# Patient Record
Sex: FEMALE
[De-identification: names, ages, dates, MRNs, and addresses within clinical notes are randomized per-mention and may not be internally consistent; named-entity substitution may affect disease eponyms.]

---

## 2017-08-25 ENCOUNTER — TRANSCRIPTION ENCOUNTER (OUTPATIENT)
Age: 57
End: 2017-08-25

## 2018-04-02 ENCOUNTER — TRANSCRIPTION ENCOUNTER (OUTPATIENT)
Age: 58
End: 2018-04-02

## 2018-08-11 ENCOUNTER — TRANSCRIPTION ENCOUNTER (OUTPATIENT)
Age: 58
End: 2018-08-11

## 2019-04-05 ENCOUNTER — TRANSCRIPTION ENCOUNTER (OUTPATIENT)
Age: 59
End: 2019-04-05

## 2019-11-11 ENCOUNTER — TRANSCRIPTION ENCOUNTER (OUTPATIENT)
Age: 59
End: 2019-11-11

## 2019-11-27 ENCOUNTER — TRANSCRIPTION ENCOUNTER (OUTPATIENT)
Age: 59
End: 2019-11-27

## 2020-04-08 ENCOUNTER — TRANSCRIPTION ENCOUNTER (OUTPATIENT)
Age: 60
End: 2020-04-08

## 2022-11-04 ENCOUNTER — OFFICE (OUTPATIENT)
Dept: URBAN - METROPOLITAN AREA CLINIC 12 | Facility: CLINIC | Age: 62
Setting detail: OPHTHALMOLOGY
End: 2022-11-04
Payer: COMMERCIAL

## 2022-11-04 ENCOUNTER — RX ONLY (RX ONLY)
Age: 62
End: 2022-11-04

## 2022-11-04 DIAGNOSIS — B30.9: ICD-10-CM

## 2022-11-04 DIAGNOSIS — H20.13: ICD-10-CM

## 2022-11-04 PROCEDURE — 99212 OFFICE O/P EST SF 10 MIN: CPT | Performed by: STUDENT IN AN ORGANIZED HEALTH CARE EDUCATION/TRAINING PROGRAM

## 2022-11-04 ASSESSMENT — CONFRONTATIONAL VISUAL FIELD TEST (CVF)
OS_FINDINGS: FULL
OD_FINDINGS: FULL

## 2022-11-04 ASSESSMENT — TONOMETRY
OS_IOP_MMHG: 11
OD_IOP_MMHG: 13

## 2022-11-04 ASSESSMENT — SUPERFICIAL PUNCTATE KERATITIS (SPK)
OD_SPK: T
OS_SPK: T

## 2022-11-05 ENCOUNTER — RX ONLY (RX ONLY)
Age: 62
End: 2022-11-05

## 2022-11-05 ASSESSMENT — REFRACTION_MANIFEST
OD_AXIS: 030
OS_CYLINDER: -0.25
OS_ADD: +2.00
OD_ADD: +2.00
OD_VA1: 20/25-1
OS_SPHERE: +0.50
OD_SPHERE: -0.75
OS_AXIS: 010
OD_SPHERE: -1.75
OD_CYLINDER: -0.75
OS_VA1: 20/30
OS_VA1: 20/30
OD_VA1: 20/30
OS_AXIS: 120
OS_SPHERE: PLANO
OD_AXIS: 25
OS_CYLINDER: -0.25
OD_CYLINDER: -0.75

## 2022-11-05 ASSESSMENT — KERATOMETRY
METHOD_AUTO_MANUAL: AUTO
OD_K1POWER_DIOPTERS: 36.50
OS_K2POWER_DIOPTERS: 37.25
OS_K1POWER_DIOPTERS: 37.00
OD_AXISANGLE_DEGREES: 093
OS_AXISANGLE_DEGREES: 064
OD_K2POWER_DIOPTERS: 36.75

## 2022-11-05 ASSESSMENT — REFRACTION_CURRENTRX
OD_OVR_VA: 20/
OS_VPRISM_DIRECTION: SV
OS_CYLINDER: -0.50
OS_AXIS: 108
OD_AXIS: 173
OD_VPRISM_DIRECTION: SV
OD_AXIS: 118
OD_CYLINDER: -1.25
OS_CYLINDER: SPHERE
OD_OVR_VA: 20/
OS_OVR_VA: 20/
OS_SPHERE: PLANO
OS_SPHERE: -1.25
OD_SPHERE: -0.75
OD_CYLINDER: -0.50
OD_SPHERE: -1.25
OS_AXIS: 000
OS_OVR_VA: 20/

## 2022-11-05 ASSESSMENT — AXIALLENGTH_DERIVED
OD_AL: 28.82
OS_AL: 26.0127
OD_AL: 27.5
OS_AL: 27.38
OD_AL: 26.9812

## 2022-11-05 ASSESSMENT — VISUAL ACUITY
OD_BCVA: 20/60-1
OS_BCVA: 20/40

## 2022-11-05 ASSESSMENT — REFRACTION_AUTOREFRACTION
OS_CYLINDER: -0.25
OD_CYLINDER: -0.50
OD_SPHERE: -4.25
OS_SPHERE: -2.25
OS_AXIS: 001
OD_AXIS: 154

## 2022-11-05 ASSESSMENT — SPHEQUIV_DERIVED
OS_SPHEQUIV: -2.375
OS_SPHEQUIV: 0.375
OD_SPHEQUIV: -4.5
OD_SPHEQUIV: -1.125
OD_SPHEQUIV: -2.125

## 2022-11-18 ENCOUNTER — OFFICE (OUTPATIENT)
Dept: URBAN - METROPOLITAN AREA CLINIC 12 | Facility: CLINIC | Age: 62
Setting detail: OPHTHALMOLOGY
End: 2022-11-18
Payer: COMMERCIAL

## 2022-11-18 DIAGNOSIS — H25.13: ICD-10-CM

## 2022-11-18 DIAGNOSIS — H25.12: ICD-10-CM

## 2022-11-18 PROBLEM — B30.9 VIRAL CONJUNCTIVITIS ; RIGHT EYE: Status: RESOLVED | Noted: 2022-11-04 | Resolved: 2022-11-18

## 2022-11-18 PROCEDURE — 99214 OFFICE O/P EST MOD 30 MIN: CPT | Performed by: OPHTHALMOLOGY

## 2022-11-18 PROCEDURE — 92136 OPHTHALMIC BIOMETRY: CPT | Performed by: OPHTHALMOLOGY

## 2022-11-18 ASSESSMENT — TONOMETRY
OD_IOP_MMHG: 11
OS_IOP_MMHG: 11

## 2022-11-18 ASSESSMENT — SUPERFICIAL PUNCTATE KERATITIS (SPK)
OS_SPK: T
OD_SPK: T

## 2022-11-18 ASSESSMENT — CONFRONTATIONAL VISUAL FIELD TEST (CVF)
OD_FINDINGS: FULL
OS_FINDINGS: FULL

## 2022-12-06 ASSESSMENT — REFRACTION_MANIFEST
OS_SPHERE: -2.25
OS_AXIS: 170
OS_CYLINDER: -0.50
OD_VA1: 20/30
OD_CYLINDER: -0.75
OD_AXIS: 160
OD_AXIS: 030
OS_VA1: 20/40-1
OD_CYLINDER: -0.50
OD_SPHERE: -0.75
OD_ADD: +2.00
OS_VA1: 20/30
OS_ADD: +2.00
OS_AXIS: 010
OS_SPHERE: +0.50
OS_CYLINDER: -0.25
OD_SPHERE: -3.50

## 2022-12-06 ASSESSMENT — VISUAL ACUITY
OD_BCVA: 20/50+2
OS_BCVA: 20/40-1

## 2022-12-06 ASSESSMENT — REFRACTION_CURRENTRX
OD_SPHERE: -1.25
OS_AXIS: 108
OS_OVR_VA: 20/
OD_OVR_VA: 20/
OS_AXIS: 000
OD_SPHERE: -0.75
OS_SPHERE: -1.25
OS_SPHERE: PLANO
OS_VPRISM_DIRECTION: SV
OS_OVR_VA: 20/
OS_CYLINDER: -0.50
OD_VPRISM_DIRECTION: SV
OD_CYLINDER: -0.50
OD_OVR_VA: 20/
OD_CYLINDER: -1.25
OD_AXIS: 118
OD_AXIS: 171
OS_CYLINDER: SPHERE

## 2022-12-06 ASSESSMENT — REFRACTION_AUTOREFRACTION
OD_SPHERE: -3.50
OS_SPHERE: -2.25
OS_CYLINDER: -0.50
OS_AXIS: 172
OD_CYLINDER: -0.50
OD_AXIS: 161

## 2022-12-06 ASSESSMENT — KERATOMETRY
OS_K1POWER_DIOPTERS: 37.00
OD_AXISANGLE_DEGREES: 073
OS_K2POWER_DIOPTERS: 37.50
OD_K1POWER_DIOPTERS: 36.00
OS_AXISANGLE_DEGREES: 068
OD_K2POWER_DIOPTERS: 36.50
METHOD_AUTO_MANUAL: AUTO

## 2022-12-06 ASSESSMENT — AXIALLENGTH_DERIVED
OS_AL: 27.39
OD_AL: 27.1626
OS_AL: 27.39
OD_AL: 28.59
OD_AL: 28.59
OS_AL: 25.957

## 2022-12-06 ASSESSMENT — SPHEQUIV_DERIVED
OD_SPHEQUIV: -1.125
OS_SPHEQUIV: -2.5
OD_SPHEQUIV: -3.75
OS_SPHEQUIV: 0.375
OS_SPHEQUIV: -2.5
OD_SPHEQUIV: -3.75

## 2023-01-03 ENCOUNTER — OFFICE (OUTPATIENT)
Dept: URBAN - METROPOLITAN AREA CLINIC 12 | Facility: CLINIC | Age: 63
Setting detail: OPHTHALMOLOGY
End: 2023-01-03
Payer: COMMERCIAL

## 2023-01-03 DIAGNOSIS — H25.13: ICD-10-CM

## 2023-01-03 DIAGNOSIS — H25.12: ICD-10-CM

## 2023-01-03 PROCEDURE — 99213 OFFICE O/P EST LOW 20 MIN: CPT | Performed by: OPHTHALMOLOGY

## 2023-01-03 PROCEDURE — 92136 OPHTHALMIC BIOMETRY: CPT | Performed by: OPHTHALMOLOGY

## 2023-01-03 ASSESSMENT — SUPERFICIAL PUNCTATE KERATITIS (SPK)
OS_SPK: T
OD_SPK: T

## 2023-01-03 ASSESSMENT — SPHEQUIV_DERIVED
OD_SPHEQUIV: -4
OD_SPHEQUIV: -3.75
OS_SPHEQUIV: -2.5
OD_SPHEQUIV: -1.125
OS_SPHEQUIV: -2.375
OS_SPHEQUIV: 0.375

## 2023-01-03 ASSESSMENT — REFRACTION_MANIFEST
OS_AXIS: 010
OD_ADD: +2.00
OD_SPHERE: -0.75
OS_AXIS: 170
OD_SPHERE: -3.50
OS_VA1: 20/40-1
OS_VA1: 20/30
OS_CYLINDER: -0.25
OD_CYLINDER: -0.75
OS_SPHERE: -2.25
OD_VA1: 20/30
OS_ADD: +2.00
OD_CYLINDER: -0.50
OS_CYLINDER: -0.50
OS_SPHERE: +0.50
OD_AXIS: 030
OD_AXIS: 160

## 2023-01-03 ASSESSMENT — REFRACTION_CURRENTRX
OD_CYLINDER: -1.25
OS_OVR_VA: 20/
OD_SPHERE: -1.25
OS_CYLINDER: SPHERE
OS_VPRISM_DIRECTION: SV
OD_CYLINDER: -0.50
OS_SPHERE: PLANO
OS_AXIS: 000
OS_CYLINDER: -0.50
OD_OVR_VA: 20/
OD_SPHERE: -0.75
OD_AXIS: 118
OD_VPRISM_DIRECTION: SV
OD_OVR_VA: 20/
OS_AXIS: 108
OS_OVR_VA: 20/
OD_AXIS: 171
OS_SPHERE: -1.25

## 2023-01-03 ASSESSMENT — CONFRONTATIONAL VISUAL FIELD TEST (CVF)
OD_FINDINGS: FULL
OS_FINDINGS: FULL

## 2023-01-03 ASSESSMENT — REFRACTION_AUTOREFRACTION
OS_SPHERE: -2.25
OD_CYLINDER: -0.50
OD_AXIS: 017
OS_CYLINDER: -0.25
OD_SPHERE: -3.75
OS_AXIS: 111

## 2023-01-03 ASSESSMENT — TONOMETRY
OD_IOP_MMHG: 11
OS_IOP_MMHG: 10

## 2023-01-03 ASSESSMENT — KERATOMETRY
OS_K1POWER_DIOPTERS: 37.00
OD_AXISANGLE_DEGREES: 089
OS_K2POWER_DIOPTERS: 37.25
METHOD_AUTO_MANUAL: AUTO
OD_K2POWER_DIOPTERS: 36.75
OD_K1POWER_DIOPTERS: 36.50
OS_AXISANGLE_DEGREES: 047

## 2023-01-03 ASSESSMENT — AXIALLENGTH_DERIVED
OD_AL: 28.53
OS_AL: 26.0127
OD_AL: 26.9812
OS_AL: 27.45
OD_AL: 28.39
OS_AL: 27.38

## 2023-01-03 ASSESSMENT — VISUAL ACUITY
OD_BCVA: 20/50-1
OS_BCVA: 20/50

## 2023-01-13 ENCOUNTER — NON-APPOINTMENT (OUTPATIENT)
Age: 63
End: 2023-01-13

## 2023-01-13 ENCOUNTER — OFFICE (OUTPATIENT)
Dept: URBAN - METROPOLITAN AREA CLINIC 12 | Facility: CLINIC | Age: 63
Setting detail: OPHTHALMOLOGY
End: 2023-01-13
Payer: COMMERCIAL

## 2023-01-13 DIAGNOSIS — Z01.812: ICD-10-CM

## 2023-01-13 DIAGNOSIS — Z20.822: ICD-10-CM

## 2023-01-13 PROCEDURE — 99211 OFF/OP EST MAY X REQ PHY/QHP: CPT | Performed by: OPHTHALMOLOGY

## 2023-01-13 ASSESSMENT — KERATOMETRY
OD_K1POWER_DIOPTERS: 36.50
OD_AXISANGLE_DEGREES: 089
OS_AXISANGLE_DEGREES: 047
OD_K2POWER_DIOPTERS: 36.75
OS_K2POWER_DIOPTERS: 37.25
METHOD_AUTO_MANUAL: AUTO
OS_K1POWER_DIOPTERS: 37.00

## 2023-01-13 ASSESSMENT — AXIALLENGTH_DERIVED
OS_AL: 27.38
OS_AL: 27.45
OD_AL: 28.39
OD_AL: 26.9812
OD_AL: 28.53
OS_AL: 26.0127

## 2023-01-13 ASSESSMENT — SPHEQUIV_DERIVED
OS_SPHEQUIV: 0.375
OS_SPHEQUIV: -2.375
OS_SPHEQUIV: -2.5
OD_SPHEQUIV: -3.75
OD_SPHEQUIV: -4
OD_SPHEQUIV: -1.125

## 2023-01-13 ASSESSMENT — REFRACTION_CURRENTRX
OD_OVR_VA: 20/
OD_SPHERE: -0.75
OS_AXIS: 000
OD_AXIS: 171
OS_CYLINDER: -0.50
OD_VPRISM_DIRECTION: SV
OS_VPRISM_DIRECTION: SV
OS_OVR_VA: 20/
OS_CYLINDER: SPHERE
OD_OVR_VA: 20/
OS_SPHERE: -1.25
OD_CYLINDER: -1.25
OS_OVR_VA: 20/
OS_SPHERE: PLANO
OS_AXIS: 108
OD_SPHERE: -1.25
OD_CYLINDER: -0.50
OD_AXIS: 118

## 2023-01-13 ASSESSMENT — REFRACTION_MANIFEST
OS_SPHERE: +0.50
OD_CYLINDER: -0.75
OD_SPHERE: -0.75
OS_CYLINDER: -0.50
OS_SPHERE: -2.25
OS_AXIS: 010
OD_AXIS: 030
OS_VA1: 20/40-1
OD_AXIS: 160
OD_SPHERE: -3.50
OD_CYLINDER: -0.50
OD_VA1: 20/30
OS_AXIS: 170
OS_CYLINDER: -0.25
OS_VA1: 20/30
OD_ADD: +2.00
OS_ADD: +2.00

## 2023-01-13 ASSESSMENT — REFRACTION_AUTOREFRACTION
OD_CYLINDER: -0.50
OD_AXIS: 017
OD_SPHERE: -3.75
OS_AXIS: 111
OS_CYLINDER: -0.25
OS_SPHERE: -2.25

## 2023-01-13 ASSESSMENT — VISUAL ACUITY
OD_BCVA: 20/50-1
OS_BCVA: 20/50

## 2023-01-16 ENCOUNTER — ASC (OUTPATIENT)
Dept: URBAN - METROPOLITAN AREA SURGERY 8 | Facility: SURGERY | Age: 63
Setting detail: OPHTHALMOLOGY
End: 2023-01-16
Payer: COMMERCIAL

## 2023-01-16 DIAGNOSIS — H52.212: ICD-10-CM

## 2023-01-16 DIAGNOSIS — H25.12: ICD-10-CM

## 2023-01-16 PROCEDURE — 66984 XCAPSL CTRC RMVL W/O ECP: CPT | Performed by: OPHTHALMOLOGY

## 2023-01-16 PROCEDURE — FEMTO CATARACT LASER: Performed by: OPHTHALMOLOGY

## 2023-01-17 ENCOUNTER — RX ONLY (RX ONLY)
Age: 63
End: 2023-01-17

## 2023-01-17 ENCOUNTER — OFFICE (OUTPATIENT)
Dept: URBAN - METROPOLITAN AREA CLINIC 12 | Facility: CLINIC | Age: 63
Setting detail: OPHTHALMOLOGY
End: 2023-01-17
Payer: COMMERCIAL

## 2023-01-17 DIAGNOSIS — Z96.1: ICD-10-CM

## 2023-01-17 PROCEDURE — 99024 POSTOP FOLLOW-UP VISIT: CPT | Performed by: OPHTHALMOLOGY

## 2023-01-17 ASSESSMENT — REFRACTION_MANIFEST
OS_ADD: +2.00
OD_ADD: +2.00
OD_AXIS: 160
OS_CYLINDER: -0.25
OD_AXIS: 030
OS_SPHERE: +0.50
OS_VA1: 20/30
OD_CYLINDER: -0.50
OD_CYLINDER: -0.75
OD_SPHERE: -3.50
OD_VA1: 20/30
OS_AXIS: 010
OS_SPHERE: -2.25
OS_AXIS: 170
OD_SPHERE: -0.75
OS_VA1: 20/40-1
OS_CYLINDER: -0.50

## 2023-01-17 ASSESSMENT — KERATOMETRY
OD_AXISANGLE_DEGREES: 090
OS_K1POWER_DIOPTERS: 36.50
OS_K2POWER_DIOPTERS: 37.75
OS_AXISANGLE_DEGREES: 047
OD_K2POWER_DIOPTERS: 36.75
OD_K1POWER_DIOPTERS: 36.25
METHOD_AUTO_MANUAL: AUTO

## 2023-01-17 ASSESSMENT — REFRACTION_CURRENTRX
OD_VPRISM_DIRECTION: SV
OS_SPHERE: PLANO
OD_CYLINDER: -1.25
OS_AXIS: 108
OS_SPHERE: -1.25
OD_AXIS: 171
OD_CYLINDER: -0.50
OD_OVR_VA: 20/
OD_SPHERE: -0.75
OS_OVR_VA: 20/
OD_AXIS: 118
OS_AXIS: 000
OS_CYLINDER: SPHERE
OS_VPRISM_DIRECTION: SV
OD_SPHERE: -1.25
OS_OVR_VA: 20/
OD_OVR_VA: 20/
OS_CYLINDER: -0.50

## 2023-01-17 ASSESSMENT — SPHEQUIV_DERIVED
OS_SPHEQUIV: -0.875
OS_SPHEQUIV: -2.5
OD_SPHEQUIV: -4
OS_SPHEQUIV: 0.375
OD_SPHEQUIV: -3.75
OD_SPHEQUIV: -1.125

## 2023-01-17 ASSESSMENT — SUPERFICIAL PUNCTATE KERATITIS (SPK)
OS_SPK: T
OD_SPK: T

## 2023-01-17 ASSESSMENT — VISUAL ACUITY
OS_BCVA: 20/80
OD_BCVA: 20/30-2

## 2023-01-17 ASSESSMENT — REFRACTION_AUTOREFRACTION
OS_SPHERE: -0.25
OD_SPHERE: -3.75
OS_AXIS: 128
OD_CYLINDER: -0.50
OD_AXIS: 002
OS_CYLINDER: -1.25

## 2023-01-17 ASSESSMENT — AXIALLENGTH_DERIVED
OS_AL: 27.45
OD_AL: 27.0414
OS_AL: 26.0127
OD_AL: 28.46
OD_AL: 28.6
OS_AL: 26.6185

## 2023-01-17 ASSESSMENT — TONOMETRY
OS_IOP_MMHG: 13
OD_IOP_MMHG: 10

## 2023-01-17 ASSESSMENT — CONFRONTATIONAL VISUAL FIELD TEST (CVF)
OS_FINDINGS: FULL
OD_FINDINGS: FULL

## 2023-01-19 ENCOUNTER — NON-APPOINTMENT (OUTPATIENT)
Age: 63
End: 2023-01-19

## 2023-01-24 ENCOUNTER — OFFICE (OUTPATIENT)
Dept: URBAN - METROPOLITAN AREA CLINIC 12 | Facility: CLINIC | Age: 63
Setting detail: OPHTHALMOLOGY
End: 2023-01-24
Payer: COMMERCIAL

## 2023-01-24 DIAGNOSIS — H25.11: ICD-10-CM

## 2023-01-24 PROCEDURE — 92136 OPHTHALMIC BIOMETRY: CPT | Performed by: OPHTHALMOLOGY

## 2023-01-24 ASSESSMENT — VISUAL ACUITY
OS_BCVA: 20/50
OD_BCVA: 20/30-

## 2023-01-24 ASSESSMENT — REFRACTION_MANIFEST
OD_AXIS: 160
OS_VA1: 20/40-1
OS_CYLINDER: -0.25
OD_VA1: 20/30
OS_SPHERE: +0.50
OS_SPHERE: -2.25
OS_VA1: 20/30
OD_AXIS: 030
OS_AXIS: 010
OS_ADD: +2.00
OD_CYLINDER: -0.50
OS_CYLINDER: -0.50
OD_SPHERE: -3.50
OD_ADD: +2.00
OD_SPHERE: -0.75
OD_CYLINDER: -0.75
OS_AXIS: 170

## 2023-01-24 ASSESSMENT — SUPERFICIAL PUNCTATE KERATITIS (SPK)
OD_SPK: T
OS_SPK: T

## 2023-01-24 ASSESSMENT — AXIALLENGTH_DERIVED
OS_AL: 26.1248
OS_AL: 26.736
OS_AL: 27.57
OD_AL: 28.52
OD_AL: 29.03
OD_AL: 27.1

## 2023-01-24 ASSESSMENT — REFRACTION_CURRENTRX
OD_SPHERE: -0.75
OD_AXIS: 171
OS_CYLINDER: -0.50
OS_SPHERE: PLANO
OS_VPRISM_DIRECTION: SV
OD_OVR_VA: 20/
OS_CYLINDER: SPHERE
OS_SPHERE: -1.25
OD_CYLINDER: -1.25
OD_SPHERE: -1.25
OS_OVR_VA: 20/
OS_AXIS: 000
OS_OVR_VA: 20/
OD_AXIS: 118
OD_VPRISM_DIRECTION: SV
OD_OVR_VA: 20/
OS_AXIS: 108
OD_CYLINDER: -0.50

## 2023-01-24 ASSESSMENT — REFRACTION_AUTOREFRACTION
OS_CYLINDER: -0.75
OD_CYLINDER: -0.75
OS_AXIS: 128
OD_AXIS: 007
OD_SPHERE: -4.25
OS_SPHERE: -0.50

## 2023-01-24 ASSESSMENT — SPHEQUIV_DERIVED
OD_SPHEQUIV: -1.125
OS_SPHEQUIV: -0.875
OS_SPHEQUIV: 0.375
OD_SPHEQUIV: -4.625
OS_SPHEQUIV: -2.5
OD_SPHEQUIV: -3.75

## 2023-01-24 ASSESSMENT — KERATOMETRY
OD_K2POWER_DIOPTERS: 36.50
OS_AXISANGLE_DEGREES: 048
METHOD_AUTO_MANUAL: AUTO
OD_AXISANGLE_DEGREES: 088
OS_K2POWER_DIOPTERS: 37.25
OS_K1POWER_DIOPTERS: 36.50
OD_K1POWER_DIOPTERS: 36.25

## 2023-01-24 ASSESSMENT — CONFRONTATIONAL VISUAL FIELD TEST (CVF)
OD_FINDINGS: FULL
OS_FINDINGS: FULL

## 2023-01-24 ASSESSMENT — TONOMETRY
OD_IOP_MMHG: 10
OS_IOP_MMHG: 10

## 2023-02-07 ENCOUNTER — NON-APPOINTMENT (OUTPATIENT)
Age: 63
End: 2023-02-07

## 2023-02-13 ENCOUNTER — ASC (OUTPATIENT)
Dept: URBAN - METROPOLITAN AREA SURGERY 8 | Facility: SURGERY | Age: 63
Setting detail: OPHTHALMOLOGY
End: 2023-02-13
Payer: COMMERCIAL

## 2023-02-13 DIAGNOSIS — H25.11: ICD-10-CM

## 2023-02-13 DIAGNOSIS — H52.211: ICD-10-CM

## 2023-02-13 PROCEDURE — 66984 XCAPSL CTRC RMVL W/O ECP: CPT | Performed by: OPHTHALMOLOGY

## 2023-02-13 PROCEDURE — FEMTO CATARACT LASER: Performed by: OPHTHALMOLOGY

## 2023-02-14 ENCOUNTER — OFFICE (OUTPATIENT)
Dept: URBAN - METROPOLITAN AREA CLINIC 12 | Facility: CLINIC | Age: 63
Setting detail: OPHTHALMOLOGY
End: 2023-02-14
Payer: COMMERCIAL

## 2023-02-14 ENCOUNTER — RX ONLY (RX ONLY)
Age: 63
End: 2023-02-14

## 2023-02-14 DIAGNOSIS — Z96.1: ICD-10-CM

## 2023-02-14 PROCEDURE — 99024 POSTOP FOLLOW-UP VISIT: CPT | Performed by: OPHTHALMOLOGY

## 2023-02-14 ASSESSMENT — REFRACTION_MANIFEST
OS_AXIS: 010
OS_SPHERE: -2.25
OS_VA1: 20/30
OS_CYLINDER: -0.50
OD_ADD: +2.00
OD_SPHERE: -0.75
OS_ADD: +2.00
OD_AXIS: 030
OS_CYLINDER: -0.25
OD_VA1: 20/30
OD_SPHERE: -3.50
OD_CYLINDER: -0.75
OS_VA1: 20/40-1
OD_CYLINDER: -0.50
OS_AXIS: 170
OS_SPHERE: +0.50
OD_AXIS: 160

## 2023-02-14 ASSESSMENT — KERATOMETRY
OS_K2POWER_DIOPTERS: 37.75
OD_K1POWER_DIOPTERS: 36.50
OS_AXISANGLE_DEGREES: 047
OD_K2POWER_DIOPTERS: 37.00
OS_K1POWER_DIOPTERS: 36.75
OD_AXISANGLE_DEGREES: 076
METHOD_AUTO_MANUAL: AUTO

## 2023-02-14 ASSESSMENT — SPHEQUIV_DERIVED
OS_SPHEQUIV: -1
OD_SPHEQUIV: -3.75
OS_SPHEQUIV: -2.5
OS_SPHEQUIV: 0.375
OD_SPHEQUIV: -3.5
OD_SPHEQUIV: -1.125

## 2023-02-14 ASSESSMENT — AXIALLENGTH_DERIVED
OD_AL: 28.32
OS_AL: 25.957
OD_AL: 28.18
OD_AL: 26.9213
OS_AL: 27.39
OS_AL: 26.6221

## 2023-02-14 ASSESSMENT — VISUAL ACUITY
OS_BCVA: 20/70-1
OD_BCVA: 20/40-1

## 2023-02-14 ASSESSMENT — REFRACTION_CURRENTRX
OD_OVR_VA: 20/
OS_OVR_VA: 20/
OD_VPRISM_DIRECTION: SV
OD_SPHERE: -1.25
OS_VPRISM_DIRECTION: SV
OD_OVR_VA: 20/
OD_AXIS: 171
OD_AXIS: 118
OD_CYLINDER: -1.25
OS_SPHERE: PLANO
OS_CYLINDER: SPHERE
OS_CYLINDER: -0.50
OD_SPHERE: -0.75
OS_AXIS: 000
OS_OVR_VA: 20/
OD_CYLINDER: -0.50
OS_AXIS: 108
OS_SPHERE: -1.25

## 2023-02-14 ASSESSMENT — REFRACTION_AUTOREFRACTION
OS_AXIS: 127
OS_SPHERE: -0.50
OS_CYLINDER: -1.00
OD_CYLINDER: -0.50
OD_AXIS: 125
OD_SPHERE: -3.25

## 2023-02-14 ASSESSMENT — TONOMETRY: OD_IOP_MMHG: 10

## 2023-02-14 ASSESSMENT — SUPERFICIAL PUNCTATE KERATITIS (SPK)
OD_SPK: T
OS_SPK: T

## 2023-02-14 ASSESSMENT — CONFRONTATIONAL VISUAL FIELD TEST (CVF)
OS_FINDINGS: FULL
OD_FINDINGS: FULL

## 2023-02-21 ENCOUNTER — OFFICE (OUTPATIENT)
Dept: URBAN - METROPOLITAN AREA CLINIC 12 | Facility: CLINIC | Age: 63
Setting detail: OPHTHALMOLOGY
End: 2023-02-21
Payer: COMMERCIAL

## 2023-02-21 DIAGNOSIS — Z96.1: ICD-10-CM

## 2023-02-21 PROCEDURE — 99024 POSTOP FOLLOW-UP VISIT: CPT | Performed by: OPHTHALMOLOGY

## 2023-02-21 ASSESSMENT — REFRACTION_MANIFEST
OD_ADD: +2.00
OS_VA1: 20/30
OD_SPHERE: -0.75
OS_SPHERE: +0.50
OD_CYLINDER: -0.50
OD_AXIS: 030
OD_SPHERE: -3.50
OS_AXIS: 170
OD_VA1: 20/30
OS_SPHERE: -2.25
OD_CYLINDER: -0.75
OS_CYLINDER: -0.25
OD_AXIS: 160
OS_ADD: +2.00
OS_AXIS: 010
OS_CYLINDER: -0.50
OS_VA1: 20/40-1

## 2023-02-21 ASSESSMENT — KERATOMETRY
METHOD_AUTO_MANUAL: AUTO
OS_AXISANGLE_DEGREES: 41
OS_K1POWER_DIOPTERS: 36.50
OD_K1POWER_DIOPTERS: 35.75
OD_K2POWER_DIOPTERS: 36.50
OS_K2POWER_DIOPTERS: 37.75
OD_AXISANGLE_DEGREES: 73

## 2023-02-21 ASSESSMENT — SPHEQUIV_DERIVED
OD_SPHEQUIV: -3.5
OD_SPHEQUIV: -3.75
OS_SPHEQUIV: 0.375
OD_SPHEQUIV: -1.125
OS_SPHEQUIV: -1
OS_SPHEQUIV: -2.5

## 2023-02-21 ASSESSMENT — AXIALLENGTH_DERIVED
OS_AL: 26.0127
OD_AL: 28.66
OD_AL: 27.2236
OS_AL: 26.6807
OD_AL: 28.51
OS_AL: 27.45

## 2023-02-21 ASSESSMENT — REFRACTION_AUTOREFRACTION
OS_SPHERE: -0.50
OD_SPHERE: -3.25
OD_CYLINDER: -0.50
OS_AXIS: 122
OS_CYLINDER: -1.00
OD_AXIS: 147

## 2023-02-21 ASSESSMENT — VISUAL ACUITY
OD_BCVA: 20/40+1
OS_BCVA: 20/150

## 2023-02-21 ASSESSMENT — REFRACTION_CURRENTRX
OS_AXIS: 108
OS_AXIS: 000
OD_AXIS: 171
OD_CYLINDER: -1.25
OS_VPRISM_DIRECTION: SV
OD_CYLINDER: -0.50
OD_OVR_VA: 20/
OD_VPRISM_DIRECTION: SV
OS_SPHERE: PLANO
OD_SPHERE: -0.75
OD_SPHERE: -1.25
OS_CYLINDER: -0.50
OD_OVR_VA: 20/
OS_CYLINDER: SPHERE
OS_OVR_VA: 20/
OS_SPHERE: -1.25
OS_OVR_VA: 20/
OD_AXIS: 118

## 2023-02-21 ASSESSMENT — TONOMETRY
OD_IOP_MMHG: 11
OS_IOP_MMHG: 10

## 2023-02-21 ASSESSMENT — CONFRONTATIONAL VISUAL FIELD TEST (CVF)
OS_FINDINGS: FULL
OD_FINDINGS: FULL

## 2023-02-21 ASSESSMENT — SUPERFICIAL PUNCTATE KERATITIS (SPK)
OS_SPK: T
OD_SPK: T

## 2023-04-06 ENCOUNTER — OFFICE (OUTPATIENT)
Dept: URBAN - METROPOLITAN AREA CLINIC 12 | Facility: CLINIC | Age: 63
Setting detail: OPHTHALMOLOGY
End: 2023-04-06
Payer: COMMERCIAL

## 2023-04-06 DIAGNOSIS — Z96.1: ICD-10-CM

## 2023-04-06 PROBLEM — H52.13: Status: ACTIVE | Noted: 2023-04-06

## 2023-04-06 PROBLEM — H33.312 H/O RETINAL TEAR; LEFT EYE: Status: ACTIVE | Noted: 2023-04-06

## 2023-04-06 PROBLEM — H35.412 LATTICE DEGENERATION; LEFT EYE: Status: ACTIVE | Noted: 2023-04-06

## 2023-04-06 PROBLEM — H35.372 EPIRETINAL MEMBRANE;  , LEFT EYE: Status: ACTIVE | Noted: 2023-04-06

## 2023-04-06 PROCEDURE — 99024 POSTOP FOLLOW-UP VISIT: CPT | Performed by: OPHTHALMOLOGY

## 2023-04-06 ASSESSMENT — AXIALLENGTH_DERIVED
OS_AL: 27.51
OD_AL: 28.52
OS_AL: 26.615
OD_AL: 27.1
OS_AL: 26.0686
OD_AL: 28.45

## 2023-04-06 ASSESSMENT — REFRACTION_CURRENTRX
OS_AXIS: 108
OD_VPRISM_DIRECTION: SV
OD_CYLINDER: -0.50
OD_CYLINDER: -1.25
OS_OVR_VA: 20/
OD_AXIS: 118
OD_SPHERE: -0.75
OS_SPHERE: PLANO
OS_OVR_VA: 20/
OD_AXIS: 171
OS_SPHERE: -1.25
OD_OVR_VA: 20/
OS_CYLINDER: SPHERE
OD_OVR_VA: 20/
OS_VPRISM_DIRECTION: SV
OD_SPHERE: -1.25
OS_AXIS: 000
OS_CYLINDER: -0.50

## 2023-04-06 ASSESSMENT — REFRACTION_MANIFEST
OS_SPHERE: +0.50
OD_AXIS: 160
OS_AXIS: 170
OS_CYLINDER: -0.50
OS_CYLINDER: -0.25
OD_SPHERE: -3.50
OS_ADD: +2.00
OS_SPHERE: -2.25
OD_ADD: +2.00
OD_CYLINDER: -0.75
OD_VA1: 20/30
OD_SPHERE: -0.75
OD_AXIS: 030
OD_CYLINDER: -0.50
OS_VA1: 20/30
OS_AXIS: 010
OS_VA1: 20/40-1

## 2023-04-06 ASSESSMENT — KERATOMETRY
METHOD_AUTO_MANUAL: AUTO
OS_AXISANGLE_DEGREES: 054
OS_K1POWER_DIOPTERS: 36.50
OS_K2POWER_DIOPTERS: 37.50
OD_K2POWER_DIOPTERS: 36.75
OD_AXISANGLE_DEGREES: 077
OD_K1POWER_DIOPTERS: 36.00

## 2023-04-06 ASSESSMENT — REFRACTION_AUTOREFRACTION
OD_SPHERE: -3.25
OS_SPHERE: -0.25
OS_AXIS: 133
OS_CYLINDER: -1.00
OD_AXIS: 144
OD_CYLINDER: -0.75

## 2023-04-06 ASSESSMENT — CONFRONTATIONAL VISUAL FIELD TEST (CVF)
OS_FINDINGS: FULL
OD_FINDINGS: FULL

## 2023-04-06 ASSESSMENT — TONOMETRY
OD_IOP_MMHG: 11
OS_IOP_MMHG: 12

## 2023-04-06 ASSESSMENT — SPHEQUIV_DERIVED
OD_SPHEQUIV: -1.125
OS_SPHEQUIV: -2.5
OD_SPHEQUIV: -3.75
OD_SPHEQUIV: -3.625
OS_SPHEQUIV: -0.75
OS_SPHEQUIV: 0.375

## 2023-04-06 ASSESSMENT — VISUAL ACUITY
OD_BCVA: 20/50+2
OS_BCVA: 20/100

## 2023-04-06 ASSESSMENT — SUPERFICIAL PUNCTATE KERATITIS (SPK)
OD_SPK: T
OS_SPK: T

## 2023-06-13 ENCOUNTER — OFFICE (OUTPATIENT)
Dept: URBAN - METROPOLITAN AREA CLINIC 12 | Facility: CLINIC | Age: 63
Setting detail: OPHTHALMOLOGY
End: 2023-06-13
Payer: COMMERCIAL

## 2023-06-13 DIAGNOSIS — H35.40: ICD-10-CM

## 2023-06-13 DIAGNOSIS — H20.13: ICD-10-CM

## 2023-06-13 DIAGNOSIS — H35.63: ICD-10-CM

## 2023-06-13 DIAGNOSIS — H52.13: ICD-10-CM

## 2023-06-13 DIAGNOSIS — H35.412: ICD-10-CM

## 2023-06-13 DIAGNOSIS — H40.013: ICD-10-CM

## 2023-06-13 DIAGNOSIS — H35.372: ICD-10-CM

## 2023-06-13 DIAGNOSIS — H43.813: ICD-10-CM

## 2023-06-13 DIAGNOSIS — H33.312: ICD-10-CM

## 2023-06-13 PROBLEM — H26.493 POSTERIOR CAPSULAR OPACIFICATION; BOTH EYES: Status: ACTIVE | Noted: 2023-06-13

## 2023-06-13 PROCEDURE — 92014 COMPRE OPH EXAM EST PT 1/>: CPT | Performed by: OPHTHALMOLOGY

## 2023-06-13 PROCEDURE — 92134 CPTRZ OPH DX IMG PST SGM RTA: CPT | Performed by: OPHTHALMOLOGY

## 2023-06-13 ASSESSMENT — SPHEQUIV_DERIVED
OD_SPHEQUIV: -3.75
OD_SPHEQUIV: -3.375
OS_SPHEQUIV: -0.75
OS_SPHEQUIV: -2.5
OS_SPHEQUIV: 0.375
OD_SPHEQUIV: -1.125

## 2023-06-13 ASSESSMENT — REFRACTION_CURRENTRX
OD_SPHERE: -1.25
OD_AXIS: 171
OD_CYLINDER: -1.25
OS_CYLINDER: -0.50
OS_OVR_VA: 20/
OD_VPRISM_DIRECTION: SV
OS_SPHERE: PLANO
OD_OVR_VA: 20/
OD_SPHERE: -0.75
OS_OVR_VA: 20/
OS_AXIS: 000
OS_SPHERE: -1.25
OD_AXIS: 118
OD_OVR_VA: 20/
OD_CYLINDER: -0.50
OS_AXIS: 108
OS_CYLINDER: SPHERE
OS_VPRISM_DIRECTION: SV

## 2023-06-13 ASSESSMENT — REFRACTION_MANIFEST
OD_ADD: +2.00
OS_VA1: 20/40-1
OS_CYLINDER: -0.50
OS_ADD: +2.00
OD_CYLINDER: -0.50
OS_CYLINDER: -0.25
OS_AXIS: 170
OD_AXIS: 160
OD_SPHERE: -0.75
OS_SPHERE: +0.50
OD_SPHERE: -3.50
OD_VA1: 20/30
OS_AXIS: 010
OS_VA1: 20/30
OD_AXIS: 030
OD_CYLINDER: -0.75
OS_SPHERE: -2.25

## 2023-06-13 ASSESSMENT — KERATOMETRY
OS_K2POWER_DIOPTERS: 37.25
OS_AXISANGLE_DEGREES: 51
OD_K2POWER_DIOPTERS: 36.75
OS_K1POWER_DIOPTERS: 36.50
OD_K1POWER_DIOPTERS: 36.25
OD_AXISANGLE_DEGREES: 77
METHOD_AUTO_MANUAL: AUTO

## 2023-06-13 ASSESSMENT — CONFRONTATIONAL VISUAL FIELD TEST (CVF)
OS_FINDINGS: FULL
OD_FINDINGS: FULL

## 2023-06-13 ASSESSMENT — AXIALLENGTH_DERIVED
OD_AL: 28.46
OS_AL: 26.6736
OD_AL: 27.0414
OD_AL: 28.24
OS_AL: 27.57
OS_AL: 26.1248

## 2023-06-13 ASSESSMENT — VISUAL ACUITY
OD_BCVA: 20/40
OS_BCVA: 20/60-1

## 2023-06-13 ASSESSMENT — SUPERFICIAL PUNCTATE KERATITIS (SPK)
OS_SPK: T
OD_SPK: T

## 2023-06-13 ASSESSMENT — REFRACTION_AUTOREFRACTION
OD_CYLINDER: -0.25
OS_SPHERE: -0.50
OS_AXIS: 122
OS_CYLINDER: -0.50
OD_AXIS: 116
OD_SPHERE: -3.25

## 2023-06-13 ASSESSMENT — TONOMETRY: OD_IOP_MMHG: 11

## 2023-12-12 ENCOUNTER — OFFICE (OUTPATIENT)
Dept: URBAN - METROPOLITAN AREA CLINIC 12 | Facility: CLINIC | Age: 63
Setting detail: OPHTHALMOLOGY
End: 2023-12-12
Payer: COMMERCIAL

## 2023-12-12 DIAGNOSIS — H26.493: ICD-10-CM

## 2023-12-12 DIAGNOSIS — H40.013: ICD-10-CM

## 2023-12-12 DIAGNOSIS — H35.372: ICD-10-CM

## 2023-12-12 DIAGNOSIS — H20.13: ICD-10-CM

## 2023-12-12 PROCEDURE — 92250 FUNDUS PHOTOGRAPHY W/I&R: CPT | Performed by: OPHTHALMOLOGY

## 2023-12-12 PROCEDURE — 92014 COMPRE OPH EXAM EST PT 1/>: CPT | Performed by: OPHTHALMOLOGY

## 2023-12-12 ASSESSMENT — REFRACTION_MANIFEST
OS_VA1: 20/40-1
OD_ADD: +2.00
OS_CYLINDER: -0.50
OD_AXIS: 030
OD_SPHERE: -3.50
OD_CYLINDER: -0.75
OD_SPHERE: -0.75
OD_AXIS: 160
OS_SPHERE: -2.25
OS_VA1: 20/30
OS_AXIS: 170
OD_CYLINDER: -0.50
OS_CYLINDER: -0.25
OS_SPHERE: +0.50
OS_ADD: +2.00
OS_AXIS: 010
OD_VA1: 20/30

## 2023-12-12 ASSESSMENT — REFRACTION_AUTOREFRACTION
OD_AXIS: 157
OS_SPHERE: -0.50
OD_SPHERE: -3.25
OS_CYLINDER: -0.50
OS_AXIS: 121
OD_CYLINDER: -0.25

## 2023-12-12 ASSESSMENT — CONFRONTATIONAL VISUAL FIELD TEST (CVF)
OS_FINDINGS: FULL
OD_FINDINGS: FULL

## 2023-12-12 ASSESSMENT — REFRACTION_CURRENTRX
OS_CYLINDER: -0.50
OS_VPRISM_DIRECTION: SV
OS_AXIS: 108
OD_OVR_VA: 20/
OS_OVR_VA: 20/
OD_CYLINDER: SPHERE
OD_VPRISM_DIRECTION: SV
OS_AXIS: 132
OD_SPHERE: -2.50
OD_OVR_VA: 20/
OS_SPHERE: PLANO
OS_OVR_VA: 20/
OD_CYLINDER: -0.50
OD_AXIS: 118
OD_AXIS: 000
OS_SPHERE: PLANO
OS_CYLINDER: -0.50
OD_SPHERE: -1.25

## 2023-12-12 ASSESSMENT — SPHEQUIV_DERIVED
OS_SPHEQUIV: 0.375
OD_SPHEQUIV: -1.125
OD_SPHEQUIV: -3.375
OD_SPHEQUIV: -3.75
OS_SPHEQUIV: -0.75
OS_SPHEQUIV: -2.5

## 2023-12-12 ASSESSMENT — SUPERFICIAL PUNCTATE KERATITIS (SPK)
OS_SPK: T
OD_SPK: T

## 2024-03-12 ENCOUNTER — NON-APPOINTMENT (OUTPATIENT)
Age: 64
End: 2024-03-12

## 2024-04-26 ENCOUNTER — NON-APPOINTMENT (OUTPATIENT)
Age: 64
End: 2024-04-26

## 2024-06-11 ENCOUNTER — OFFICE (OUTPATIENT)
Dept: URBAN - METROPOLITAN AREA CLINIC 12 | Facility: CLINIC | Age: 64
Setting detail: OPHTHALMOLOGY
End: 2024-06-11
Payer: COMMERCIAL

## 2024-06-11 DIAGNOSIS — H35.412: ICD-10-CM

## 2024-06-11 DIAGNOSIS — H52.13: ICD-10-CM

## 2024-06-11 DIAGNOSIS — H26.493: ICD-10-CM

## 2024-06-11 DIAGNOSIS — H35.40: ICD-10-CM

## 2024-06-11 DIAGNOSIS — H40.013: ICD-10-CM

## 2024-06-11 DIAGNOSIS — Z96.1: ICD-10-CM

## 2024-06-11 DIAGNOSIS — H43.813: ICD-10-CM

## 2024-06-11 DIAGNOSIS — H35.372: ICD-10-CM

## 2024-06-11 DIAGNOSIS — H20.13: ICD-10-CM

## 2024-06-11 DIAGNOSIS — H35.63: ICD-10-CM

## 2024-06-11 DIAGNOSIS — H33.312: ICD-10-CM

## 2024-06-11 PROCEDURE — 92012 INTRM OPH EXAM EST PATIENT: CPT | Performed by: OPHTHALMOLOGY

## 2024-06-11 PROCEDURE — 92083 EXTENDED VISUAL FIELD XM: CPT | Performed by: OPHTHALMOLOGY

## 2024-06-11 PROCEDURE — 92133 CPTRZD OPH DX IMG PST SGM ON: CPT | Performed by: OPHTHALMOLOGY

## 2024-06-11 ASSESSMENT — CONFRONTATIONAL VISUAL FIELD TEST (CVF)
OS_FINDINGS: FULL
OD_FINDINGS: FULL

## 2024-08-18 ENCOUNTER — OFFICE (OUTPATIENT)
Dept: URBAN - METROPOLITAN AREA CLINIC 12 | Facility: CLINIC | Age: 64
Setting detail: OPHTHALMOLOGY
End: 2024-08-18
Payer: COMMERCIAL

## 2024-08-18 DIAGNOSIS — H00.024: ICD-10-CM

## 2024-08-18 PROCEDURE — 92012 INTRM OPH EXAM EST PATIENT: CPT | Performed by: OPTOMETRIST

## 2024-08-18 ASSESSMENT — CONFRONTATIONAL VISUAL FIELD TEST (CVF)
OD_FINDINGS: FULL
OS_FINDINGS: FULL

## 2024-08-19 ENCOUNTER — APPOINTMENT (OUTPATIENT)
Dept: OBGYN | Facility: CLINIC | Age: 64
End: 2024-08-19
Payer: COMMERCIAL

## 2024-08-19 VITALS
WEIGHT: 150 LBS | DIASTOLIC BLOOD PRESSURE: 78 MMHG | BODY MASS INDEX: 23.54 KG/M2 | HEIGHT: 67 IN | SYSTOLIC BLOOD PRESSURE: 118 MMHG

## 2024-08-19 DIAGNOSIS — M81.0 AGE-RELATED OSTEOPOROSIS W/OUT CURRENT PATHOLOGICAL FRACTURE: ICD-10-CM

## 2024-08-19 DIAGNOSIS — N60.12 DIFFUSE CYSTIC MASTOPATHY OF RIGHT BREAST: ICD-10-CM

## 2024-08-19 DIAGNOSIS — Z82.62 FAMILY HISTORY OF OSTEOPOROSIS: ICD-10-CM

## 2024-08-19 DIAGNOSIS — Z12.39 ENCOUNTER FOR OTHER SCREENING FOR MALIGNANT NEOPLASM OF BREAST: ICD-10-CM

## 2024-08-19 DIAGNOSIS — N60.11 DIFFUSE CYSTIC MASTOPATHY OF RIGHT BREAST: ICD-10-CM

## 2024-08-19 DIAGNOSIS — Z12.4 ENCOUNTER FOR SCREENING FOR MALIGNANT NEOPLASM OF CERVIX: ICD-10-CM

## 2024-08-19 DIAGNOSIS — Z01.419 ENCOUNTER FOR GYNECOLOGICAL EXAMINATION (GENERAL) (ROUTINE) W/OUT ABNORMAL FINDINGS: ICD-10-CM

## 2024-08-19 PROCEDURE — 99396 PREV VISIT EST AGE 40-64: CPT

## 2024-08-19 PROCEDURE — 99386 PREV VISIT NEW AGE 40-64: CPT

## 2024-08-19 RX ORDER — FLUTICASONE PROPIONATE 50 UG/1
50 SPRAY, METERED NASAL
Qty: 48 | Refills: 0 | Status: ACTIVE | COMMUNITY
Start: 2024-05-21

## 2024-08-19 RX ORDER — DENOSUMAB 60 MG/ML
60 INJECTION SUBCUTANEOUS
Refills: 0 | Status: ACTIVE | COMMUNITY

## 2024-08-19 RX ORDER — OMEPRAZOLE 40 MG/1
40 CAPSULE, DELAYED RELEASE ORAL
Qty: 90 | Refills: 0 | Status: ACTIVE | COMMUNITY
Start: 2023-12-03

## 2024-08-19 RX ORDER — DAPSONE 50 MG/G
5 GEL TOPICAL
Qty: 60 | Refills: 0 | Status: ACTIVE | COMMUNITY
Start: 2024-02-15

## 2024-08-19 RX ORDER — AZELASTINE HYDROCHLORIDE 137 UG/1
137 SPRAY, METERED NASAL
Qty: 90 | Refills: 0 | Status: ACTIVE | COMMUNITY
Start: 2024-05-21

## 2024-08-19 RX ORDER — MIRABEGRON 50 MG/1
TABLET, FILM COATED, EXTENDED RELEASE ORAL
Refills: 0 | Status: ACTIVE | COMMUNITY

## 2024-08-19 RX ORDER — MESALAMINE 800 MG/1
800 TABLET, DELAYED RELEASE ORAL
Qty: 540 | Refills: 0 | Status: ACTIVE | COMMUNITY
Start: 2023-11-20

## 2024-08-19 RX ORDER — MIRABEGRON 25 MG/1
25 TABLET, FILM COATED, EXTENDED RELEASE ORAL
Qty: 21 | Refills: 0 | Status: ACTIVE | COMMUNITY
Start: 2024-02-23

## 2024-08-19 RX ORDER — ESCITALOPRAM OXALATE 10 MG/1
10 TABLET ORAL
Qty: 90 | Refills: 0 | Status: ACTIVE | COMMUNITY
Start: 2024-03-11

## 2024-08-19 RX ORDER — ALPRAZOLAM 0.25 MG/1
0.25 TABLET ORAL
Qty: 30 | Refills: 0 | Status: ACTIVE | COMMUNITY
Start: 2024-03-11

## 2024-08-19 RX ORDER — CERTOLIZUMAB PEGOL 200 MG/ML
200 INJECTION, SOLUTION SUBCUTANEOUS
Qty: 1 | Refills: 0 | Status: ACTIVE | COMMUNITY
Start: 2024-05-23

## 2024-08-19 RX ORDER — SIMVASTATIN 40 MG/1
40 TABLET, FILM COATED ORAL
Qty: 90 | Refills: 0 | Status: ACTIVE | COMMUNITY
Start: 2024-02-12

## 2024-08-19 RX ORDER — NITROFURANTOIN (MONOHYDRATE/MACROCRYSTALS) 25; 75 MG/1; MG/1
100 CAPSULE ORAL
Qty: 10 | Refills: 0 | Status: ACTIVE | COMMUNITY
Start: 2024-03-13

## 2024-08-19 RX ORDER — TOBRAMYCIN AND DEXAMETHASONE 3; 1 MG/ML; MG/ML
0.3-0.1 SUSPENSION/ DROPS OPHTHALMIC
Qty: 5 | Refills: 0 | Status: ACTIVE | COMMUNITY
Start: 2024-08-18

## 2024-08-19 NOTE — PLAN
[FreeTextEntry1] : Annual Pap smear drawn and sent.  Prescription for mammo and sono given.  Is followed by rheumatologist for her bones, will continue Prolia through his office.

## 2024-08-19 NOTE — HISTORY OF PRESENT ILLNESS
[postmenopausal] : postmenopausal [Y] : Positive pregnancy history [Currently Active] : currently active [Men] : men [No] : No [Mammogramdate] : 08/23 [TextBox_19] : Z&P-negative with sono [BreastSonogramDate] : 08/23 [TextBox_25] : Z&P [PapSmeardate] : 2023 [TextBox_31] : Negative [BoneDensityDate] : 06/24 [TextBox_37] : DR. WHITEHEAD-all levels are now osteopenia.  On Prolia x 6 years [TextBox_43] : Normal, history of Crohn's disease, gets colonoscopy every 2 years. [ColonoscopyDate] : 12/23 [LMPDate] :  [PGHxTotal] : 2 [[facility]] : 1 [FreeTextEntry1] : 1

## 2024-08-19 NOTE — PHYSICAL EXAM
[Appropriately responsive] : appropriately responsive [Alert] : alert [No Acute Distress] : no acute distress [No Lymphadenopathy] : no lymphadenopathy [Regular Rate Rhythm] : regular rate rhythm [No Murmurs] : no murmurs [Clear to Auscultation B/L] : clear to auscultation bilaterally [Soft] : soft [Non-tender] : non-tender [Non-distended] : non-distended [No HSM] : No HSM [No Lesions] : no lesions [No Mass] : no mass [Oriented x3] : oriented x3 [FreeTextEntry1] : Normal, no lesions [FreeTextEntry2] : Normal, no lesions [FreeTextEntry4] : Normal, no lesions seen or palpated.  Trace clear discharge in vault [FreeTextEntry5] : Smooth, pink, no lesions.  No cervical motion tenderness [FreeTextEntry6] : Anteverted, small, mobile, nontender.  No adnexal masses or tenderness bilaterally [FreeTextEntry9] : Deferred, colonoscopy up to date

## 2024-08-26 LAB — CYTOLOGY CVX/VAG DOC THIN PREP: ABNORMAL

## 2024-09-06 ENCOUNTER — OFFICE (OUTPATIENT)
Dept: URBAN - METROPOLITAN AREA CLINIC 12 | Facility: CLINIC | Age: 64
Setting detail: OPHTHALMOLOGY
End: 2024-09-06
Payer: COMMERCIAL

## 2024-09-06 ENCOUNTER — RX ONLY (RX ONLY)
Age: 64
End: 2024-09-06

## 2024-09-06 DIAGNOSIS — T15.12XA: ICD-10-CM

## 2024-09-06 PROCEDURE — 65205 REMOVE FOREIGN BODY FROM EYE: CPT | Mod: LT | Performed by: OPHTHALMOLOGY

## 2024-09-06 ASSESSMENT — CONFRONTATIONAL VISUAL FIELD TEST (CVF)
OS_FINDINGS: FULL
OD_FINDINGS: FULL

## 2024-09-07 ENCOUNTER — OFFICE (OUTPATIENT)
Dept: URBAN - METROPOLITAN AREA CLINIC 12 | Facility: CLINIC | Age: 64
Setting detail: OPHTHALMOLOGY
End: 2024-09-07
Payer: COMMERCIAL

## 2024-09-07 DIAGNOSIS — T15.12XD: ICD-10-CM

## 2024-09-07 PROCEDURE — 99213 OFFICE O/P EST LOW 20 MIN: CPT | Performed by: OPTOMETRIST

## 2024-09-07 ASSESSMENT — CONFRONTATIONAL VISUAL FIELD TEST (CVF)
OD_FINDINGS: FULL
OS_FINDINGS: FULL

## 2024-10-05 ENCOUNTER — NON-APPOINTMENT (OUTPATIENT)
Age: 64
End: 2024-10-05

## 2024-12-11 ENCOUNTER — OFFICE (OUTPATIENT)
Dept: URBAN - METROPOLITAN AREA CLINIC 12 | Facility: CLINIC | Age: 64
Setting detail: OPHTHALMOLOGY
End: 2024-12-11
Payer: COMMERCIAL

## 2024-12-11 DIAGNOSIS — H43.813: ICD-10-CM

## 2024-12-11 DIAGNOSIS — H26.493: ICD-10-CM

## 2024-12-11 DIAGNOSIS — H35.40: ICD-10-CM

## 2024-12-11 DIAGNOSIS — H35.412: ICD-10-CM

## 2024-12-11 DIAGNOSIS — H40.013: ICD-10-CM

## 2024-12-11 DIAGNOSIS — H35.372: ICD-10-CM

## 2024-12-11 DIAGNOSIS — H20.13: ICD-10-CM

## 2024-12-11 DIAGNOSIS — H35.63: ICD-10-CM

## 2024-12-11 DIAGNOSIS — H33.312: ICD-10-CM

## 2024-12-11 PROCEDURE — 92014 COMPRE OPH EXAM EST PT 1/>: CPT | Performed by: OPHTHALMOLOGY

## 2024-12-11 PROCEDURE — 92134 CPTRZ OPH DX IMG PST SGM RTA: CPT | Performed by: OPHTHALMOLOGY

## 2024-12-11 ASSESSMENT — KERATOMETRY
OS_AXISANGLE_DEGREES: 031
OD_K2POWER_DIOPTERS: 36.50
METHOD_AUTO_MANUAL: AUTO
OD_K1POWER_DIOPTERS: 36.25
OD_AXISANGLE_DEGREES: 088
OS_K2POWER_DIOPTERS: 37.25
OS_K1POWER_DIOPTERS: 36.50

## 2024-12-11 ASSESSMENT — REFRACTION_CURRENTRX
OS_OVR_VA: 20/
OD_SPHERE: -2.50
OS_SPHERE: PLANO
OS_OVR_VA: 20/
OD_CYLINDER: -0.25
OD_SPHERE: -1.25
OD_OVR_VA: 20/
OD_CYLINDER: -0.50
OS_SPHERE: PLANO
OD_AXIS: 118
OS_CYLINDER: -0.50
OD_OVR_VA: 20/
OS_VPRISM_DIRECTION: SV
OS_AXIS: 108
OS_CYLINDER: -0.50
OD_VPRISM_DIRECTION: SV
OD_AXIS: 145
OS_AXIS: 120

## 2024-12-11 ASSESSMENT — REFRACTION_MANIFEST
OS_CYLINDER: -0.75
OS_VA1: 20/25-
OS_VA1: 20/NI
OD_SPHERE: -2.50
OS_CYLINDER: -0.50
OS_AXIS: 120
OD_CYLINDER: -0.25
OS_SPHERE: -0.25
OD_CYLINDER: -0.25
OS_SPHERE: PLANO
OD_VA1: 20/25-
OD_AXIS: 145
OD_VA1: 20/NI
OD_AXIS: 125
OD_SPHERE: -3.25
OS_AXIS: 120

## 2024-12-11 ASSESSMENT — REFRACTION_AUTOREFRACTION
OD_SPHERE: -3.25
OS_CYLINDER: -0.75
OD_AXIS: 123
OS_AXIS: 121
OD_CYLINDER: -0.25
OS_SPHERE: -0.25

## 2024-12-11 ASSESSMENT — VISUAL ACUITY
OS_BCVA: 20/25-2
OD_BCVA: 20/30+2

## 2024-12-11 ASSESSMENT — CONFRONTATIONAL VISUAL FIELD TEST (CVF)
OS_FINDINGS: FULL
OD_FINDINGS: FULL

## 2024-12-11 ASSESSMENT — SUPERFICIAL PUNCTATE KERATITIS (SPK)
OD_SPK: T
OS_SPK: T

## 2025-06-10 ENCOUNTER — OFFICE (OUTPATIENT)
Dept: URBAN - METROPOLITAN AREA CLINIC 12 | Facility: CLINIC | Age: 65
Setting detail: OPHTHALMOLOGY
End: 2025-06-10
Payer: COMMERCIAL

## 2025-06-10 DIAGNOSIS — Z96.1: ICD-10-CM

## 2025-06-10 DIAGNOSIS — H35.372: ICD-10-CM

## 2025-06-10 DIAGNOSIS — H43.813: ICD-10-CM

## 2025-06-10 DIAGNOSIS — H52.7: ICD-10-CM

## 2025-06-10 DIAGNOSIS — H26.493: ICD-10-CM

## 2025-06-10 DIAGNOSIS — H35.412: ICD-10-CM

## 2025-06-10 DIAGNOSIS — H33.312: ICD-10-CM

## 2025-06-10 DIAGNOSIS — H35.40: ICD-10-CM

## 2025-06-10 DIAGNOSIS — H20.13: ICD-10-CM

## 2025-06-10 DIAGNOSIS — H35.63: ICD-10-CM

## 2025-06-10 DIAGNOSIS — H40.013: ICD-10-CM

## 2025-06-10 PROCEDURE — 92014 COMPRE OPH EXAM EST PT 1/>: CPT | Performed by: OPHTHALMOLOGY

## 2025-06-10 PROCEDURE — 92083 EXTENDED VISUAL FIELD XM: CPT | Performed by: OPHTHALMOLOGY

## 2025-06-10 PROCEDURE — 92133 CPTRZD OPH DX IMG PST SGM ON: CPT | Performed by: OPHTHALMOLOGY

## 2025-06-10 ASSESSMENT — REFRACTION_CURRENTRX
OS_SPHERE: PLANO
OD_CYLINDER: -0.25
OD_SPHERE: -1.25
OS_AXIS: 116
OS_OVR_VA: 20/
OS_CYLINDER: -0.50
OS_CYLINDER: -0.50
OD_AXIS: 118
OD_VPRISM_DIRECTION: SV
OD_OVR_VA: 20/
OS_AXIS: 108
OD_CYLINDER: -0.50
OS_SPHERE: PLANO
OS_VPRISM_DIRECTION: SV
OD_OVR_VA: 20/
OD_SPHERE: -2.75
OS_OVR_VA: 20/
OD_AXIS: 165

## 2025-06-10 ASSESSMENT — REFRACTION_MANIFEST
OS_AXIS: 120
OD_SPHERE: -2.50
OS_AXIS: 120
OD_CYLINDER: -0.25
OD_VA1: 20/25-
OS_SPHERE: -0.25
OS_CYLINDER: -0.50
OD_CYLINDER: -0.25
OS_SPHERE: PLANO
OD_SPHERE: -2.75
OS_VA1: 20/25-
OS_CYLINDER: -0.50
OD_AXIS: 150
OD_AXIS: 145

## 2025-06-10 ASSESSMENT — KERATOMETRY
OD_K1POWER_DIOPTERS: 36.25
OS_K1POWER_DIOPTERS: 36.50
METHOD_AUTO_MANUAL: AUTO
OS_K2POWER_DIOPTERS: 37.25
OD_K2POWER_DIOPTERS: 36.75
OS_AXISANGLE_DEGREES: 030
OD_AXISANGLE_DEGREES: 084

## 2025-06-10 ASSESSMENT — SUPERFICIAL PUNCTATE KERATITIS (SPK)
OD_SPK: T
OS_SPK: T

## 2025-06-10 ASSESSMENT — REFRACTION_AUTOREFRACTION
OD_SPHERE: -3.50
OS_AXIS: 121
OD_AXIS: 164
OS_CYLINDER: -1.00
OD_CYLINDER: -0.25
OS_SPHERE: -0.50

## 2025-06-10 ASSESSMENT — PACHYMETRY
OD_CT_UM: 446
OS_CT_UM: 404
OD_CT_CORRECTION: 7
OS_CT_CORRECTION: >7

## 2025-06-10 ASSESSMENT — VISUAL ACUITY
OD_BCVA: 20/40-1
OS_BCVA: 20/30+1

## 2025-06-10 ASSESSMENT — CONFRONTATIONAL VISUAL FIELD TEST (CVF)
OD_FINDINGS: FULL
OS_FINDINGS: FULL

## 2025-08-08 ENCOUNTER — OFFICE (OUTPATIENT)
Dept: URBAN - METROPOLITAN AREA CLINIC 12 | Facility: CLINIC | Age: 65
Setting detail: OPHTHALMOLOGY
End: 2025-08-08
Payer: COMMERCIAL

## 2025-08-08 ENCOUNTER — RX ONLY (RX ONLY)
Age: 65
End: 2025-08-08

## 2025-08-08 DIAGNOSIS — H26.492: ICD-10-CM

## 2025-08-08 PROCEDURE — 66821 AFTER CATARACT LASER SURGERY: CPT | Mod: LT | Performed by: OPHTHALMOLOGY

## 2025-08-08 ASSESSMENT — REFRACTION_MANIFEST
OS_SPHERE: PLANO
OS_AXIS: 120
OD_CYLINDER: -0.25
OS_VA1: 20/25-
OD_VA1: 20/25-
OD_SPHERE: -2.50
OS_CYLINDER: -0.50
OD_AXIS: 150
OD_CYLINDER: -0.25
OD_SPHERE: -2.75
OS_AXIS: 120
OS_SPHERE: -0.25
OD_AXIS: 145
OS_CYLINDER: -0.50

## 2025-08-08 ASSESSMENT — REFRACTION_CURRENTRX
OS_OVR_VA: 20/
OS_CYLINDER: -0.50
OS_VPRISM_DIRECTION: SV
OD_SPHERE: -1.25
OS_CYLINDER: -0.50
OD_VPRISM_DIRECTION: SV
OS_AXIS: 116
OS_AXIS: 108
OD_CYLINDER: -0.50
OS_SPHERE: PLANO
OD_OVR_VA: 20/
OS_SPHERE: PLANO
OD_SPHERE: -2.75
OD_AXIS: 165
OD_OVR_VA: 20/
OD_CYLINDER: -0.25
OS_OVR_VA: 20/
OD_AXIS: 118

## 2025-08-08 ASSESSMENT — CONFRONTATIONAL VISUAL FIELD TEST (CVF)
OD_FINDINGS: FULL
OS_FINDINGS: FULL

## 2025-08-08 ASSESSMENT — SUPERFICIAL PUNCTATE KERATITIS (SPK)
OD_SPK: T
OS_SPK: T

## 2025-08-08 ASSESSMENT — KERATOMETRY
OD_K2POWER_DIOPTERS: 37.25
METHOD_AUTO_MANUAL: AUTO
OS_K2POWER_DIOPTERS: 37.00
OD_K1POWER_DIOPTERS: 36.25
OS_K1POWER_DIOPTERS: 36.50
OD_AXISANGLE_DEGREES: 086
OS_AXISANGLE_DEGREES: 054

## 2025-08-08 ASSESSMENT — REFRACTION_AUTOREFRACTION
OS_AXIS: 116
OS_SPHERE: -0.50
OD_CYLINDER: -0.50
OS_CYLINDER: -1.00
OD_AXIS: 173
OD_SPHERE: -3.25

## 2025-08-08 ASSESSMENT — VISUAL ACUITY
OD_BCVA: 20/50
OS_BCVA: 20/25-2

## 2025-08-13 ENCOUNTER — RX ONLY (RX ONLY)
Age: 65
End: 2025-08-13

## 2025-08-13 ENCOUNTER — OFFICE (OUTPATIENT)
Dept: URBAN - METROPOLITAN AREA CLINIC 12 | Facility: CLINIC | Age: 65
Setting detail: OPHTHALMOLOGY
End: 2025-08-13
Payer: COMMERCIAL

## 2025-08-13 DIAGNOSIS — H26.491: ICD-10-CM

## 2025-08-13 PROCEDURE — 66821 AFTER CATARACT LASER SURGERY: CPT | Mod: RT | Performed by: OPHTHALMOLOGY

## 2025-08-13 ASSESSMENT — KERATOMETRY
METHOD_AUTO_MANUAL: AUTO
OD_K2POWER_DIOPTERS: 36.50
OD_AXISANGLE_DEGREES: 086
OD_K1POWER_DIOPTERS: 36.25
OS_AXISANGLE_DEGREES: 036
OS_K2POWER_DIOPTERS: 37.25
OS_K1POWER_DIOPTERS: 36.50

## 2025-08-13 ASSESSMENT — CONFRONTATIONAL VISUAL FIELD TEST (CVF)
OS_FINDINGS: FULL
OD_FINDINGS: FULL

## 2025-08-13 ASSESSMENT — REFRACTION_CURRENTRX
OD_AXIS: 165
OD_VPRISM_DIRECTION: SV
OS_VPRISM_DIRECTION: SV
OD_CYLINDER: -0.25
OD_AXIS: 118
OS_CYLINDER: -0.50
OS_AXIS: 108
OS_SPHERE: PLANO
OS_CYLINDER: -0.50
OD_SPHERE: -2.75
OD_OVR_VA: 20/
OS_OVR_VA: 20/
OS_AXIS: 116
OS_OVR_VA: 20/
OS_SPHERE: PLANO
OD_SPHERE: -1.25
OD_CYLINDER: -0.50
OD_OVR_VA: 20/

## 2025-08-13 ASSESSMENT — SUPERFICIAL PUNCTATE KERATITIS (SPK)
OD_SPK: T
OS_SPK: T

## 2025-08-13 ASSESSMENT — REFRACTION_MANIFEST
OS_AXIS: 120
OD_CYLINDER: -0.25
OD_SPHERE: -2.75
OS_CYLINDER: -0.50
OS_SPHERE: PLANO
OS_CYLINDER: -0.50
OS_SPHERE: -0.25
OS_VA1: 20/25-
OS_VA1: 20/25
OS_AXIS: 115
OD_AXIS: 160
OD_VA1: 20/25-
OD_CYLINDER: -0.25
OD_SPHERE: -2.75
OD_VA1: 20/25
OD_AXIS: 145
OS_CYLINDER: -0.50
OD_CYLINDER: -0.25
OD_SPHERE: -2.50
OD_AXIS: 150
OS_SPHERE: -0.25
OS_AXIS: 120

## 2025-08-13 ASSESSMENT — REFRACTION_AUTOREFRACTION
OD_CYLINDER: -0.50
OS_SPHERE: -0.25
OS_AXIS: 109
OD_SPHERE: -3.25
OD_AXIS: 161
OS_CYLINDER: -0.75

## 2025-08-13 ASSESSMENT — VISUAL ACUITY
OS_BCVA: 20/50
OD_BCVA: 20/40

## 2025-09-04 DIAGNOSIS — Z13.820 ENCOUNTER FOR SCREENING FOR OSTEOPOROSIS: ICD-10-CM

## 2025-09-05 ENCOUNTER — APPOINTMENT (OUTPATIENT)
Dept: OBGYN | Facility: CLINIC | Age: 65
End: 2025-09-05
Payer: COMMERCIAL

## 2025-09-05 ENCOUNTER — NON-APPOINTMENT (OUTPATIENT)
Age: 65
End: 2025-09-05

## 2025-09-05 VITALS
SYSTOLIC BLOOD PRESSURE: 124 MMHG | HEIGHT: 67 IN | WEIGHT: 154 LBS | BODY MASS INDEX: 24.17 KG/M2 | DIASTOLIC BLOOD PRESSURE: 70 MMHG

## 2025-09-05 DIAGNOSIS — N60.12 DIFFUSE CYSTIC MASTOPATHY OF RIGHT BREAST: ICD-10-CM

## 2025-09-05 DIAGNOSIS — N39.0 URINARY TRACT INFECTION, SITE NOT SPECIFIED: ICD-10-CM

## 2025-09-05 DIAGNOSIS — Z01.419 ENCOUNTER FOR GYNECOLOGICAL EXAMINATION (GENERAL) (ROUTINE) W/OUT ABNORMAL FINDINGS: ICD-10-CM

## 2025-09-05 DIAGNOSIS — Z12.4 ENCOUNTER FOR SCREENING FOR MALIGNANT NEOPLASM OF CERVIX: ICD-10-CM

## 2025-09-05 DIAGNOSIS — Z12.39 ENCOUNTER FOR OTHER SCREENING FOR MALIGNANT NEOPLASM OF BREAST: ICD-10-CM

## 2025-09-05 DIAGNOSIS — N60.11 DIFFUSE CYSTIC MASTOPATHY OF RIGHT BREAST: ICD-10-CM

## 2025-09-05 PROCEDURE — 99396 PREV VISIT EST AGE 40-64: CPT

## 2025-09-05 RX ORDER — NITROFURANTOIN (MONOHYDRATE/MACROCRYSTALS) 25; 75 MG/1; MG/1
100 CAPSULE ORAL
Qty: 14 | Refills: 0 | Status: ACTIVE | COMMUNITY
Start: 2025-09-05 | End: 1900-01-01

## 2025-09-05 RX ORDER — ROSUVASTATIN CALCIUM 5 MG/1
TABLET, FILM COATED ORAL
Refills: 0 | Status: ACTIVE | COMMUNITY

## 2025-09-09 LAB — CYTOLOGY CVX/VAG DOC THIN PREP: ABNORMAL

## 2025-09-12 ENCOUNTER — APPOINTMENT (OUTPATIENT)
Dept: CARDIOLOGY | Facility: CLINIC | Age: 65
End: 2025-09-12
Payer: COMMERCIAL

## 2025-09-12 VITALS
SYSTOLIC BLOOD PRESSURE: 120 MMHG | HEART RATE: 70 BPM | HEIGHT: 67 IN | BODY MASS INDEX: 24.33 KG/M2 | OXYGEN SATURATION: 97 % | WEIGHT: 155 LBS | DIASTOLIC BLOOD PRESSURE: 80 MMHG

## 2025-09-12 DIAGNOSIS — E78.5 HYPERLIPIDEMIA, UNSPECIFIED: ICD-10-CM

## 2025-09-12 DIAGNOSIS — I25.10 ATHEROSCLEROTIC HEART DISEASE OF NATIVE CORONARY ARTERY W/OUT ANGINA PECTORIS: ICD-10-CM

## 2025-09-12 PROCEDURE — 93000 ELECTROCARDIOGRAM COMPLETE: CPT

## 2025-09-12 PROCEDURE — 99204 OFFICE O/P NEW MOD 45 MIN: CPT | Mod: 25

## 2025-09-12 RX ORDER — ROSUVASTATIN CALCIUM 10 MG/1
10 TABLET, FILM COATED ORAL
Qty: 90 | Refills: 2 | Status: ACTIVE | COMMUNITY
Start: 2025-09-12 | End: 1900-01-01